# Patient Record
(demographics unavailable — no encounter records)

---

## 2024-12-10 NOTE — DATA REVIEWED
[FreeTextEntry1] : EXAM: MRI LUMBAR SPINE WITHOUT CONTRAST  HISTORY: Lumbar degenerative disc disease.  TECHNIQUE: Sagittal T1, T2, STIR, coronal T1 , axial T1 and T2 weighted sequences were obtained.   Intravenous Contrast: None. Scanner: Siemens Avanto at 1.5T.  FINDINGS: Comparison: There is no prior study of this region at [facility]. There is a transitional vertebra that will be designated S1, so the most well-formed caudal intervertebral disc is S1-2.  The height and alignment of the vertebral bodies are intact.  The pedicles and posterior elements are preserved.  Bone marrow signal is uniform.   T11-12 through L3-4: The height and signal intensity of the intervertebral discs are intact. There is no canal or neural foraminal encroachment. L4-5: There is partial desiccation of the intervertebral disc.  There is moderate right neural foraminal stenosis. L5-S1: Intervertebral disc intact.   There is early left facet osteoarthritis. S1-2: There is an intact intervertebral disc.  There is no disc herniation or epidural mass.   There are no intradural masses.  The conus medullaris tapers normally.   There is no paraspinal mass. The aorta is non-dilated.   IMPRESSION: Using the numerical scheme described above, the following conclusions are made:  At L4-5 there is early disc degeneration and moderate right neural foraminal encroachment without impingement upon the intraforaminal portion of the right L4 nerve root. Early left facet osteoarthritis at L5-S1. No disc herniation or fracture.

## 2024-12-10 NOTE — HISTORY OF PRESENT ILLNESS
[Back Pain] : back pain [Constant] : constant [8] : a current pain level of 8/10 [7] : an average pain level of 7/10 [6] : a minimum pain level of 6/10 [10] : a maximum pain level of 10/10 [Dull] : dull [Aching] : aching [Throbbing] : throbbing [Bending] : bending [Rest] : rest [FreeTextEntry1] : 36 year old female presents w/ right-sided low back pain. Her pain began in August 2024. Pain radiated posteriorly down the right leg. Her pain initially resolved but then it returned after walking at the [facility]. Pt had seen Dr. Randle previously and had worsened pain with two trigger point injections (11/12/,11/19). She has seen a spine surgeon who recommended PT, which she is currently in.

## 2024-12-10 NOTE — PHYSICAL EXAM
[de-identified] : Constitutional: Well-developed, in no acute distress  Musculoskeletal: Lumbar Spine:   Gait: Antalgic 		Inspection: Normal curvature, no abnormal kyphosis or scoliosis 		Facet loading: pain bilaterally 		Palpation: 			Lumbar and paraspinal muscles: pain bilaterally 			Sacroiliac joint: no pain 			Greater trochanter: no pain 		Muscle Strength: 		Iliopsoas: 5/5 bilaterally 		Quadriceps: 5/5 bilaterally 		Hamstrings: 5/5 bilaterally 		Tibialis anterior: 5/5 bilaterally 		Extensor hallucis longus: 5/5 bilaterally  		Sensation: normal and equal in bilateral lower extremities  Extremity: no edema noted Neurological: Memory normal, AAO x 3, Cranial nerves II - XII grossly normal Psychiatric: Appropriate mood and affect, oriented to time, place, person, and situation

## 2024-12-10 NOTE — ASSESSMENT
[FreeTextEntry1] : 36 yof w/ severe low back and right leg pain.  I have personally reviewed the patient's MRI in detail and discussed my personal interpretation of the study which is significant for moderate right foraminal narrowing at L4-L5.  The patient has failed to have relief with medication management. The patient has failed to have relief with more then six weeks of physical therapy within the last three months. Given the patients failure to improve with all other conservative measures, recommend L5-S1 interlaminar epidural steroid injection under fluoroscopic guidance. The patient will follow-up with me in my office two weeks following intervention.  I have discussed in detail with the patient that an interventional spine procedure is associated with potential risks. The procedure may include an injection of steroid and potentially other medications (local anesthetic and normal saline) into the epidural space or surrounding tissue of the spine. There are significant risks of this procedure which include and are not limited to infection, bleeding, worsening pain, dural puncture leading to post-dural puncture headache, nerve damage, spinal cord injury, paralysis, stroke, and death. There is a chance that the procedure does not improve their pain. There are risks associated with the steroid being absorbed into the body systemically. These include dysphoria, difficulty sleeping, mood swings, and personality changes. Pre-menopausal women may notice a regularity his in her menstrual cycle for 2-3 months following the injection. Steroids can specifically affect patients with hypertension, diabetes, and peptic ulcers. The procedure may cause a temporary increase in blood pressure and blood glucose, and may adversely affect a peptic ulcer. Other, more rare complications, including avascular necrosis of the joints, glaucoma, and osteoporosis. I have discussed the risks of the procedure at length with the patient, and the potential benefits of pain relief. I have offered alternatives to the procedure. All questions were answered. The patient expressed understanding and wishes to proceed with the procedure.  Physical therapy prescribed - goal will be to increase ROM, strengthening, postural training, other modalities ad paula which may include massage and stim. Goals of therapy discussed with the patient in detail and will be discussed with physical therapist. Patient will follow-up following course of physical therapy to monitor progress and adjust therapy as needed.  Acetaminophen 1,000 mg q8h prn for moderate pain. Risks, benefits, and alternatives of acetaminophen discussed with patient.  Ibuprofen 600 mg q8h prn add when pain is not adequately controlled with acetaminophen. Risks, benefits, and alternatives of ibuprofen discussed with patient.  Diet and nutritional strategies discussed which may improve patients pain and will improve overall health.

## 2025-01-06 NOTE — PROCEDURE
[FreeTextEntry1] : PROCEDURE: L5-S1 Interlaminar epidural steroid injection under fluoroscopic guidance.  CHIEF COMPLAINT: Low back and leg pain.    PREOPERATIVE DIAGNOSIS:	Lumbar radiculopathy.  POSTOPERATIVE DIAGNOSIS:  Lumbar radiculopathy. 	  ANESTHESIA:  Local.  COMPLICATIONS:  	None.  ESTIMATED BLOOD LOSS:  None.	  INDICATIONS: Mrs. Fierro is a very pleasant 36 yof who has significant low back and leg pain.  The patient has failed to have relief with medication management and physical therapy. Given their failure to improve with all other conservative measures, it was determined that the patient would benefit from a L5-S1 interlaminar epidural steroid injection under fluoroscopic guidance.    The patient denies any fevers, chills, nausea, vomiting, diarrhea, constipation, chest pain, shortness of breath, or burning on urination.  They deny any latex allergy.  They are not taking any anticoagulants and do not have a history of coagulopathy.  The patient denies any IV contrast allergy.       PROCEDURE COURSE: The risks, benefits, and alternatives of the lumbar interlaminar epidural steroid injection were explained to the patient.  All questions were answered to their own satisfaction.  Written consent was signed and placed in the chart. The patients low back was marked in the preoperative holding area.   The patient was brought to the Operating Room and placed in the prone position with a pillow under the abdomen to reduce lumbar lordosis.  A time-out was taken identifying the patient, the procedure, the site and side, and the patients allergies.  ASA standard monitors were applied for monitoring throughout the procedure.  The patients back was prepped and draped with Chloroprep in the usual sterile fashion and sterile technique was adhered to throughout the entire procedure.  The lumbar spine was visualized under fluoroscopy in the AP view. The 12th rib and T12 vertebra were identified as a reference point and the lumbar vertebral bodies were counted.  The L5 vertebral body was then squared. The vertebral body and the superior and inferior end plates were aligned and the spinous processes were adjusted until midline. The L5-S1 interlaminar space was identified under fluoroscopic guidance and a caudal tilt was utilized to optimize the opening of the interlaminar space. The skin and subcutaneous tissues were infiltrated with 1% Lidocaine. After adequate local anesthesia was obtained, a 20g Tuohy needle was inserted at L5-S1 interspace. The needle was carefully advanced, alternating between AP and lateral views, to ensure appropriate trajectory and depth. Once the ligamentum flavum was engaged, a sterile glass syringe was employed and a loss of resistance to air technique was utilized to identify the epidural space. Once obtained, negative aspiration for hem and CSF was obtained, further confirming location. Following this, 1 cc of contrast was administered and epidural spread was confirmed in the AP and lateral views. Following this, 80 mg of methylprednisolone and 2 cc of 1% lidocaine was slowly administered in incremental amounts.  All injections were done with negative aspiration for cerebrospinal fluid or heme, and all needles were withdrawn without incident. I personally met with the patient following the procedure and all questions were answered. The patient tolerated the procedure well without any apparent complications and was transferred to the Recovery Room in stable condition. The patient was provided with discharge instructions and will follow-up with me in my office.  	___________________________________ 	Ruddy Ojeda M.D.

## 2025-01-28 NOTE — PHYSICAL EXAM
[de-identified] : Constitutional: Well-developed, in no acute distress  Musculoskeletal: Lumbar Spine:   Gait: Antalgic 		Inspection: Normal curvature, no abnormal kyphosis or scoliosis 		Facet loading: pain bilaterally 		Palpation: 			Lumbar and paraspinal muscles: pain bilaterally 			Sacroiliac joint: no pain 			Greater trochanter: no pain 		Muscle Strength: 		Iliopsoas: 5/5 bilaterally 		Quadriceps: 5/5 bilaterally 		Hamstrings: 5/5 bilaterally 		Tibialis anterior: 5/5 bilaterally 		Extensor hallucis longus: 5/5 bilaterally  		Sensation: normal and equal in bilateral lower extremities  Extremity: no edema noted Neurological: Memory normal, AAO x 3, Cranial nerves II - XII grossly normal Psychiatric: Appropriate mood and affect, oriented to time, place, person, and situation

## 2025-01-28 NOTE — HISTORY OF PRESENT ILLNESS
[Back Pain] : back pain [Constant] : constant [8] : a current pain level of 8/10 [7] : an average pain level of 7/10 [6] : a minimum pain level of 6/10 [10] : a maximum pain level of 10/10 [Dull] : dull [Aching] : aching [Throbbing] : throbbing [Bending] : bending [Rest] : rest [FreeTextEntry1] : Interval history: Pt returns for follow-up today, 01/28/25. She is s/p ELIDA. She reports back tightness up to her neck with muscle spasms. Tizanidine helps her sleep. Quality of life is impaired. There has been a severe exacerbation of the patient's chronic pain. Recently had the flu and was very sick. Is taking gabapentin 600-300-300 mg.   HPI: 36 year old female presents w/ right-sided low back pain. Her pain began in August 2024. Pain radiated posteriorly down the right leg. Her pain initially resolved but then it returned after walking at the [facility] on 09/08/24. Pt had seen Dr. Randle previously and had worsened pain with two trigger point injections (11/12/,11/19). She has seen a spine surgeon (Dr. Santiago) who recommended PT, which she is currently in.   Interventions: L5-S1 interlaminar ELIDA (01/06/25):

## 2025-01-28 NOTE — ASSESSMENT
[FreeTextEntry1] : 36 yof w/ severe low back and right leg pain which remains.  I have personally reviewed the patient's MRI in detail and discussed my personal interpretation of the study which is significant for moderate right foraminal narrowing at L4-L5.  The patient has failed to have relief with medication management. The patient has failed to have relief with more then six weeks of physical therapy within the last three months. Given the patients failure to improve with all other conservative measures, recommend right L5-S1 transforaminal epidural steroid injection under fluoroscopic guidance. The patient will follow-up with me in my office two weeks following intervention.  Physical therapy prescribed - goal will be to increase ROM, strengthening, postural training, other modalities ad paula which may include massage and stim. Goals of therapy discussed with the patient in detail and will be discussed with physical therapist. Patient will follow-up following course of physical therapy to monitor progress and adjust therapy as needed.  Acetaminophen 1,000 mg q8h prn for moderate pain. Risks, benefits, and alternatives of acetaminophen discussed with patient.  Ibuprofen 600 mg q8h prn add when pain is not adequately controlled with acetaminophen. Risks, benefits, and alternatives of ibuprofen discussed with patient.  Diet and nutritional strategies discussed which may improve patients pain and will improve overall health.

## 2025-01-28 NOTE — DATA REVIEWED
[FreeTextEntry1] : EXAM: MRI LUMBAR SPINE WITHOUT CONTRAST  FINDINGS: Comparison: There is no prior study of this region at [facility]. There is a transitional vertebra that will be designated S1, so the most well-formed caudal intervertebral disc is S1-2.  The height and alignment of the vertebral bodies are intact.  The pedicles and posterior elements are preserved.  Bone marrow signal is uniform.   T11-12 through L3-4: The height and signal intensity of the intervertebral discs are intact. There is no canal or neural foraminal encroachment. L4-5: There is partial desiccation of the intervertebral disc.  There is moderate right neural foraminal stenosis. L5-S1: Intervertebral disc intact.   There is early left facet osteoarthritis. S1-2: There is an intact intervertebral disc.  There is no disc herniation or epidural mass.   There are no intradural masses.  The conus medullaris tapers normally.   There is no paraspinal mass. The aorta is non-dilated.   IMPRESSION: Using the numerical scheme described above, the following conclusions are made:  At L4-5 there is early disc degeneration and moderate right neural foraminal encroachment without impingement upon the intraforaminal portion of the right L4 nerve root. Early left facet osteoarthritis at L5-S1. No disc herniation or fracture.

## 2025-02-25 NOTE — HISTORY OF PRESENT ILLNESS
[Back Pain] : back pain [Constant] : constant [8] : a current pain level of 8/10 [7] : an average pain level of 7/10 [6] : a minimum pain level of 6/10 [10] : a maximum pain level of 10/10 [Dull] : dull [Aching] : aching [Throbbing] : throbbing [Bending] : bending [Rest] : rest [FreeTextEntry1] : Interval history: Pt returns for follow-up today. She is s/p ELIDA. She reports no relief of pain. No exacerbation of pain. Tizanidine helps her sleep. Quality of life is impaired. There has been a severe exacerbation of the patient's chronic pain. Is taking gabapentin 600-300-300 mg. Pain to palpation of PSIS.  HPI: 36 year old female presents w/ right-sided low back pain. Her pain began in August 2024. Pain radiated posteriorly down the right leg. Her pain initially resolved but then it returned after walking at the [facility] on 09/08/24. Pt had seen Dr. Randle previously and had worsened pain with two trigger point injections (11/12/,11/19). She has seen a spine surgeon (Dr. Santiago) who recommended PT, which she is currently in.   Interventions: Right L4-L5 TFESI (02/04/25): L5-S1 interlaminar ELIDA (01/06/25):

## 2025-02-25 NOTE — DATA REVIEWED
[FreeTextEntry1] : EXAM: MRI LUMBAR SPINE WITHOUT CONTRAST  FINDINGS: Comparison: There is no prior study of this region at [facility]. There is a transitional vertebra that will be designated S1, so the most well-formed caudal intervertebral disc is S1-2.  The height and alignment of the vertebral bodies are intact.  The pedicles and posterior elements are preserved.  Bone marrow signal is uniform.   T11-12 through L3-4: The height and signal intensity of the intervertebral discs are intact. There is no canal or neural foraminal encroachment. L4-5: There is partial desiccation of the intervertebral disc.  There is moderate right neural foraminal stenosis. L5-S1: Intervertebral disc intact.   There is early left facet osteoarthritis. S1-2: There is an intact intervertebral disc.  There is no disc herniation or epidural mass.   There are no intradural masses.  The conus medullaris tapers normally.   There is no paraspinal mass. The aorta is non-dilated.   IMPRESSION: Using the numerical scheme described above, the following conclusions are made:  At L4-5 there is early disc degeneration and moderate right neural foraminal encroachment without impingement upon the intraforaminal portion of the right L4 nerve root. Early left facet osteoarthritis at L5-S1. No disc herniation or fracture.

## 2025-02-25 NOTE — ASSESSMENT
[FreeTextEntry1] : 36 yof w/ severe low back pain which remains despite ELIDA.  She does not desire surgical intervention.  I have personally reviewed the patient's MRI in detail and discussed my personal interpretation of the study which is significant for moderate right foraminal narrowing at L4-L5.  Due to pain to palpation of PSIS, likely sacroiliac joint related pain.  Recommend against surgery at this time.  The patient has failed to have relief with medication management. The patient has failed to have relief with more then six weeks of physical therapy within the last three months. Given the patients failure to improve with all other conservative measures, recommend right sacroiliac joint intraarticular steroid injection under fluoroscopic guidance. The patient will follow-up with me in my office two weeks following intervention.  I have discussed in detail with the patient that an interventional spine procedure is associated with potential risks. The procedure may include an injection of steroid and potentially other medications (local anesthetic and normal saline) into the epidural space or surrounding tissue of the spine. There are significant risks of this procedure which include and are not limited to infection, bleeding, worsening pain, dural puncture leading to post-dural puncture headache, nerve damage, spinal cord injury, paralysis, stroke, and death. There is a chance that the procedure does not improve their pain. There are risks associated with the steroid being absorbed into the body systemically. These include dysphoria, difficulty sleeping, mood swings, and personality changes. Pre-menopausal women may notice a regularity his in her menstrual cycle for 2-3 months following the injection. Steroids can specifically affect patients with hypertension, diabetes, and peptic ulcers. The procedure may cause a temporary increase in blood pressure and blood glucose, and may adversely affect a peptic ulcer. Other, more rare complications, including avascular necrosis of the joints, glaucoma, and osteoporosis. I have discussed the risks of the procedure at length with the patient, and the potential benefits of pain relief. I have offered alternatives to the procedure. All questions were answered. The patient expressed understanding and wishes to proceed with the procedure.  Physical therapy prescribed - goal will be to increase ROM, strengthening, postural training, other modalities ad paula which may include massage and stim. Goals of therapy discussed with the patient in detail and will be discussed with physical therapist. Patient will follow-up following course of physical therapy to monitor progress and adjust therapy as needed.  Acetaminophen 1,000 mg q8h prn for moderate pain. Risks, benefits, and alternatives of acetaminophen discussed with patient.  Ibuprofen 600 mg q8h prn add when pain is not adequately controlled with acetaminophen. Risks, benefits, and alternatives of ibuprofen discussed with patient.  Diet and nutritional strategies discussed which may improve patients pain and will improve overall health.  Based on the current evaluation and management, I will provide ongoing, longitudinal care for the complex painful condition described above. Patient is encouraged to reach out with any questions and/or concerns regarding their condition and care.

## 2025-02-25 NOTE — PHYSICAL EXAM
[de-identified] : Constitutional: Well-developed, in no acute distress  Musculoskeletal: Lumbar Spine:   Gait: Antalgic 		Inspection: Normal curvature, no abnormal kyphosis or scoliosis 		Facet loading: pain bilaterally 		Palpation: 			Lumbar and paraspinal muscles: pain bilaterally 			Sacroiliac joint: pain to palpation of right PSIS 			Greater trochanter: no pain 		Muscle Strength: 		Iliopsoas: 5/5 bilaterally 		Quadriceps: 5/5 bilaterally 		Hamstrings: 5/5 bilaterally 		Tibialis anterior: 5/5 bilaterally 		Extensor hallucis longus: 5/5 bilaterally  		Sensation: normal and equal in bilateral lower extremities  Extremity: no edema noted Neurological: Memory normal, AAO x 3, Cranial nerves II - XII grossly normal Psychiatric: Appropriate mood and affect, oriented to time, place, person, and situation

## 2025-03-25 NOTE — PROCEDURE
[FreeTextEntry1] : Right Sacroiliac Joint Intraarticular Steroid Injection  The patient was placed in the prone position on the fluoroscopic table with a pillow under the abdomen.  Routine monitors were applied.  The back was draped in the usual sterile fashion and sterile technique was adhered to throughout the entire procedure.  The right sacroiliac joint was identified under fluoroscopic guidance.  Oblique, lateral and AP views were taken to maximize visualization of the joint. The skin and subcutaneous tissues were infiltrated with 1% Lidocaine.  After adequate local anesthesia a 3.5 inch spinal needle was inserted and introduced into the SIJ with fluoro guidance. After negative aspiration for heme the patient was injected with a total of 40 mg methylprednisolone and 2 cc of 1% lidocaine.   The patient tolerated the procedure well.  There was no neurological deficit post procedure.  The patient went to recovery room in stable condition.  Discharge instructions were given to the patient.